# Patient Record
Sex: MALE | Race: BLACK OR AFRICAN AMERICAN | NOT HISPANIC OR LATINO | URBAN - METROPOLITAN AREA
[De-identification: names, ages, dates, MRNs, and addresses within clinical notes are randomized per-mention and may not be internally consistent; named-entity substitution may affect disease eponyms.]

---

## 2022-07-07 ENCOUNTER — OFFICE VISIT (OUTPATIENT)
Dept: URBAN - METROPOLITAN AREA CLINIC 70 | Facility: CLINIC | Age: 52
End: 2022-07-07
Payer: COMMERCIAL

## 2022-07-07 VITALS
WEIGHT: 307.7 LBS | SYSTOLIC BLOOD PRESSURE: 151 MMHG | BODY MASS INDEX: 39.49 KG/M2 | HEIGHT: 74 IN | DIASTOLIC BLOOD PRESSURE: 95 MMHG | TEMPERATURE: 97.7 F | HEART RATE: 63 BPM

## 2022-07-07 DIAGNOSIS — E66.01 MORBIDLY OBESE: ICD-10-CM

## 2022-07-07 DIAGNOSIS — Z86.010 PERSONAL HISTORY OF COLONIC POLYPS: ICD-10-CM

## 2022-07-07 DIAGNOSIS — K59.00 COLONIC CONSTIPATION: ICD-10-CM

## 2022-07-07 PROBLEM — 35298007: Status: ACTIVE | Noted: 2022-07-07

## 2022-07-07 PROBLEM — 238136002: Status: ACTIVE | Noted: 2022-07-07

## 2022-07-07 PROBLEM — 428283002: Status: ACTIVE | Noted: 2022-07-07

## 2022-07-07 PROCEDURE — 99204 OFFICE O/P NEW MOD 45 MIN: CPT | Performed by: INTERNAL MEDICINE

## 2022-07-07 RX ORDER — GABAPENTIN 100 MG/1
1 CAPSULE CAPSULE ORAL ONCE A DAY
Status: ACTIVE | COMMUNITY

## 2022-07-07 NOTE — HPI-TODAY'S VISIT:
Patient presents today discuss gastric bypass surgery and repair of incisional hernia.  C/o pain to epigastric region that is described as pulling sensation.  Certain movements and eating certain foods leads to increase.  Associated:  bloating, excessive flatulence.  Alleviating factors:  Defecation.  Defecation occurs 2-3 times a day.  May experience a complete sense of evacuation with defecation.   Voices CT A/P with primary care provider (Mayra Whaley) and was informed of signs of of constipation and hernia.  Last colonoscopy was in 2022 with Ella Cevallos with polyps removed.  Surveillance colonoscopy recommended in 5 years.

## 2022-08-16 ENCOUNTER — DASHBOARD ENCOUNTERS (OUTPATIENT)
Age: 52
End: 2022-08-16

## 2022-08-18 ENCOUNTER — OFFICE VISIT (OUTPATIENT)
Dept: URBAN - METROPOLITAN AREA CLINIC 70 | Facility: CLINIC | Age: 52
End: 2022-08-18

## 2022-08-18 RX ORDER — GABAPENTIN 100 MG/1
1 CAPSULE CAPSULE ORAL ONCE A DAY
Status: ACTIVE | COMMUNITY